# Patient Record
Sex: FEMALE | Race: WHITE
[De-identification: names, ages, dates, MRNs, and addresses within clinical notes are randomized per-mention and may not be internally consistent; named-entity substitution may affect disease eponyms.]

---

## 2020-12-14 ENCOUNTER — HOSPITAL ENCOUNTER (OUTPATIENT)
Age: 25
End: 2020-12-14
Payer: MEDICAID

## 2020-12-14 DIAGNOSIS — Z34.81: Primary | ICD-10-CM

## 2020-12-14 LAB
DEPRECATED RDW RBC: 45.8 FL (ref 35.1–43.9)
ERYTHROCYTE [DISTWIDTH] IN BLOOD: 14.2 % (ref 11.6–14.6)
HCT VFR BLD AUTO: 40.5 % (ref 37–47)
HEMOGLOBIN: 13.1 G/DL (ref 12–15)
HGB BLD-MCNC: 13.1 G/DL (ref 12–15)
IMMATURE GRANULOCYTES COUNT: 0.03 X10^3/UL (ref 0–0)
MCV RBC: 89.4 FL (ref 81–99)
MEAN CORP HGB CONC: 32.3 G/DL (ref 32–36)
MEAN PLATELET VOL.: 9.7 FL (ref 6.2–12)
NRBC FLAGGED BY ANALYZER: 0 % (ref 0–5)
PLATELET # BLD: 240 K/MM3 (ref 150–450)
PLATELET COUNT: 240 K/MM3 (ref 150–450)
RBC # BLD AUTO: 4.53 M/MM3 (ref 4.2–5.4)
RBC DISTRIBUTION WIDTH CV: 14.2 % (ref 11.6–14.6)
RBC DISTRIBUTION WIDTH SD: 45.8 FL (ref 35.1–43.9)
WBC # BLD AUTO: 9.3 K/MM3 (ref 4.4–11)
WHITE BLOOD COUNT: 9.3 K/MM3 (ref 4.4–11)

## 2020-12-14 PROCEDURE — 85025 COMPLETE CBC W/AUTO DIFF WBC: CPT

## 2020-12-14 PROCEDURE — 86803 HEPATITIS C AB TEST: CPT

## 2020-12-14 PROCEDURE — 86703 HIV-1/HIV-2 1 RESULT ANTBDY: CPT

## 2020-12-14 PROCEDURE — 36415 COLL VENOUS BLD VENIPUNCTURE: CPT

## 2020-12-14 PROCEDURE — 87591 N.GONORRHOEAE DNA AMP PROB: CPT

## 2020-12-14 PROCEDURE — 87186 SC STD MICRODIL/AGAR DIL: CPT

## 2020-12-14 PROCEDURE — 86762 RUBELLA ANTIBODY: CPT

## 2020-12-14 PROCEDURE — 88175 CYTOPATH C/V AUTO FLUID REDO: CPT

## 2020-12-14 PROCEDURE — 87077 CULTURE AEROBIC IDENTIFY: CPT

## 2020-12-14 PROCEDURE — 87086 URINE CULTURE/COLONY COUNT: CPT

## 2020-12-14 PROCEDURE — G0145 SCR C/V CYTO,THINLAYER,RESCR: HCPCS

## 2020-12-14 PROCEDURE — 87340 HEPATITIS B SURFACE AG IA: CPT

## 2020-12-14 PROCEDURE — 87088 URINE BACTERIA CULTURE: CPT

## 2020-12-14 PROCEDURE — 87491 CHLMYD TRACH DNA AMP PROBE: CPT

## 2020-12-17 LAB — PRENATAL RPR: NONREACTIVE

## 2021-04-05 ENCOUNTER — HOSPITAL ENCOUNTER (OUTPATIENT)
Age: 26
End: 2021-04-05
Payer: MEDICAID

## 2021-04-05 DIAGNOSIS — Z34.82: Primary | ICD-10-CM

## 2021-04-05 LAB
DEPRECATED RDW RBC: 48.1 FL (ref 35.1–43.9)
ERYTHROCYTE [DISTWIDTH] IN BLOOD: 14.6 % (ref 11.6–14.6)
GLUCOSE 1H P 50 G GLC PO SERPL-MCNC: 104 MG/DL (ref 70–140)
HCT VFR BLD AUTO: 35.4 % (ref 37–47)
HEMOGLOBIN: 11.4 G/DL (ref 12–15)
HGB BLD-MCNC: 11.4 G/DL (ref 12–15)
MCV RBC: 91.2 FL (ref 81–99)
MEAN CORP HGB CONC: 32.2 G/DL (ref 32–36)
MEAN PLATELET VOL.: 10.1 FL (ref 6.2–12)
PLATELET # BLD: 233 K/MM3 (ref 150–450)
PLATELET COUNT: 233 K/MM3 (ref 150–450)
RBC # BLD AUTO: 3.88 M/MM3 (ref 4.2–5.4)
RBC DISTRIBUTION WIDTH CV: 14.6 % (ref 11.6–14.6)
RBC DISTRIBUTION WIDTH SD: 48.1 FL (ref 35.1–43.9)
WBC # BLD AUTO: 5.6 K/MM3 (ref 4.4–11)
WHITE BLOOD COUNT: 5.6 K/MM3 (ref 4.4–11)

## 2021-04-05 PROCEDURE — 36415 COLL VENOUS BLD VENIPUNCTURE: CPT

## 2021-04-05 PROCEDURE — 82950 GLUCOSE TEST: CPT

## 2021-04-05 PROCEDURE — 85027 COMPLETE CBC AUTOMATED: CPT

## 2021-05-25 ENCOUNTER — HOSPITAL ENCOUNTER (OUTPATIENT)
Age: 26
End: 2021-05-25
Payer: MEDICAID

## 2021-05-25 DIAGNOSIS — L29.9: Primary | ICD-10-CM

## 2021-05-25 LAB
ALANINE AMINOTRANSFER ALT/SGPT: 13 U/L (ref 13–56)
ALBUMIN SERPL-MCNC: 2.5 G/DL (ref 3.2–5)
ALKALINE PHOSPHATASE: 98 U/L (ref 45–117)
ANION GAP: 8 (ref 5–15)
AST(SGOT): 16 U/L (ref 15–37)
BUN SERPL-MCNC: 7 MG/DL (ref 7–18)
BUN/CREAT RATIO: 14.1 RATIO (ref 10–20)
CALCIUM SERPL-MCNC: 8.5 MG/DL (ref 8.5–10.1)
CARBON DIOXIDE: 24 MMOL/L (ref 21–32)
CHLORIDE: 108 MMOL/L (ref 98–107)
CREAT UR-MCNC: 91.3 MG/DL
DEPRECATED RDW RBC: 44.8 FL (ref 35.1–43.9)
ERYTHROCYTE [DISTWIDTH] IN BLOOD: 14.4 % (ref 11.6–14.6)
EST GLOM FILT RATE - AFR AMER: 197 ML/MIN (ref 60–?)
GLOBULIN: 3.8 G/DL (ref 2.2–4.2)
GLUCOSE: 98 MG/DL (ref 74–106)
HCT VFR BLD AUTO: 33.8 % (ref 37–47)
HEMOGLOBIN: 10.5 G/DL (ref 12–15)
HGB BLD-MCNC: 10.5 G/DL (ref 12–15)
LDH: 181 U/L (ref 84–246)
MCV RBC: 85.8 FL (ref 81–99)
MEAN CORP HGB CONC: 31.1 G/DL (ref 32–36)
MEAN PLATELET VOL.: 11.1 FL (ref 6.2–12)
MUCOUS THREADS URNS QL MICRO: (no result) /HPF
PLATELET # BLD: 164 K/MM3 (ref 150–450)
PLATELET COUNT: 164 K/MM3 (ref 150–450)
POTASSIUM: 3.5 MMOL/L (ref 3.5–5.1)
PROT UR-MCNC: 12.2 MG/DL (ref ?–11.9)
PROT/CREAT UR: 134 MG/G CRE (ref 0–200)
RBC # BLD AUTO: 3.94 M/MM3 (ref 4.2–5.4)
RBC DISTRIBUTION WIDTH CV: 14.4 % (ref 11.6–14.6)
RBC DISTRIBUTION WIDTH SD: 44.8 FL (ref 35.1–43.9)
RBC UR QL: (no result) /HPF (ref 0–5)
SP GR UR: 1.02 (ref 1–1.03)
SQUAMOUS URNS QL MICRO: (no result) /HPF (ref 5–10)
URINE PRESERVATIVE: (no result)
WBC # BLD AUTO: 6.7 K/MM3 (ref 4.4–11)
WHITE BLOOD COUNT: 6.7 K/MM3 (ref 4.4–11)

## 2021-05-25 PROCEDURE — 80053 COMPREHEN METABOLIC PANEL: CPT

## 2021-05-25 PROCEDURE — 81001 URINALYSIS AUTO W/SCOPE: CPT

## 2021-05-25 PROCEDURE — 82570 ASSAY OF URINE CREATININE: CPT

## 2021-05-25 PROCEDURE — 36415 COLL VENOUS BLD VENIPUNCTURE: CPT

## 2021-05-25 PROCEDURE — 85027 COMPLETE CBC AUTOMATED: CPT

## 2021-05-25 PROCEDURE — 84156 ASSAY OF PROTEIN URINE: CPT

## 2021-05-25 PROCEDURE — 83615 LACTATE (LD) (LDH) ENZYME: CPT

## 2021-08-10 ENCOUNTER — HOSPITAL ENCOUNTER (OUTPATIENT)
Age: 26
End: 2021-08-10
Payer: MEDICAID

## 2021-08-10 DIAGNOSIS — R68.82: Primary | ICD-10-CM

## 2021-08-10 PROCEDURE — 84439 ASSAY OF FREE THYROXINE: CPT

## 2021-08-10 PROCEDURE — 84443 ASSAY THYROID STIM HORMONE: CPT

## 2024-05-21 ENCOUNTER — APPOINTMENT (OUTPATIENT)
Dept: OBSTETRICS AND GYNECOLOGY | Facility: CLINIC | Age: 29
End: 2024-05-21

## 2024-10-14 ENCOUNTER — HOSPITAL ENCOUNTER (EMERGENCY)
Facility: HOSPITAL | Age: 29
Discharge: OTHER INSTITUTION | End: 2024-10-15
Attending: EMERGENCY MEDICINE
Payer: COMMERCIAL

## 2024-10-14 ENCOUNTER — APPOINTMENT (OUTPATIENT)
Dept: CARDIOLOGY | Facility: HOSPITAL | Age: 29
End: 2024-10-14
Payer: COMMERCIAL

## 2024-10-14 ENCOUNTER — APPOINTMENT (OUTPATIENT)
Dept: RADIOLOGY | Facility: HOSPITAL | Age: 29
End: 2024-10-14
Payer: COMMERCIAL

## 2024-10-14 DIAGNOSIS — J18.9 PNEUMONIA DUE TO INFECTIOUS ORGANISM, UNSPECIFIED LATERALITY, UNSPECIFIED PART OF LUNG: Primary | ICD-10-CM

## 2024-10-14 LAB
ALBUMIN SERPL BCP-MCNC: 3.8 G/DL (ref 3.4–5)
ALP SERPL-CCNC: 61 U/L (ref 33–110)
ALT SERPL W P-5'-P-CCNC: 26 U/L (ref 7–45)
ANION GAP SERPL CALC-SCNC: 13 MMOL/L (ref 10–20)
AST SERPL W P-5'-P-CCNC: 15 U/L (ref 9–39)
BASOPHILS # BLD AUTO: 0.05 X10*3/UL (ref 0–0.1)
BASOPHILS NFR BLD AUTO: 0.5 %
BILIRUB SERPL-MCNC: 0.4 MG/DL (ref 0–1.2)
BUN SERPL-MCNC: 10 MG/DL (ref 6–23)
CALCIUM SERPL-MCNC: 8.7 MG/DL (ref 8.6–10.3)
CHLORIDE SERPL-SCNC: 104 MMOL/L (ref 98–107)
CO2 SERPL-SCNC: 25 MMOL/L (ref 21–32)
CREAT SERPL-MCNC: 0.56 MG/DL (ref 0.5–1.05)
EGFRCR SERPLBLD CKD-EPI 2021: >90 ML/MIN/1.73M*2
EOSINOPHIL # BLD AUTO: 0.1 X10*3/UL (ref 0–0.7)
EOSINOPHIL NFR BLD AUTO: 1 %
ERYTHROCYTE [DISTWIDTH] IN BLOOD BY AUTOMATED COUNT: 14.3 % (ref 11.5–14.5)
GLUCOSE SERPL-MCNC: 106 MG/DL (ref 74–99)
HCT VFR BLD AUTO: 40.4 % (ref 36–46)
HGB BLD-MCNC: 13.3 G/DL (ref 12–16)
IMM GRANULOCYTES # BLD AUTO: 0.05 X10*3/UL (ref 0–0.7)
IMM GRANULOCYTES NFR BLD AUTO: 0.5 % (ref 0–0.9)
LACTATE SERPL-SCNC: 2.3 MMOL/L (ref 0.4–2)
LYMPHOCYTES # BLD AUTO: 1.46 X10*3/UL (ref 1.2–4.8)
LYMPHOCYTES NFR BLD AUTO: 14 %
MCH RBC QN AUTO: 29.2 PG (ref 26–34)
MCHC RBC AUTO-ENTMCNC: 32.9 G/DL (ref 32–36)
MCV RBC AUTO: 89 FL (ref 80–100)
MONOCYTES # BLD AUTO: 0.37 X10*3/UL (ref 0.1–1)
MONOCYTES NFR BLD AUTO: 3.6 %
NEUTROPHILS # BLD AUTO: 8.39 X10*3/UL (ref 1.2–7.7)
NEUTROPHILS NFR BLD AUTO: 80.4 %
NRBC BLD-RTO: 0 /100 WBCS (ref 0–0)
PLATELET # BLD AUTO: 362 X10*3/UL (ref 150–450)
POTASSIUM SERPL-SCNC: 3 MMOL/L (ref 3.5–5.3)
PROT SERPL-MCNC: 7 G/DL (ref 6.4–8.2)
RBC # BLD AUTO: 4.55 X10*6/UL (ref 4–5.2)
SODIUM SERPL-SCNC: 139 MMOL/L (ref 136–145)
WBC # BLD AUTO: 10.4 X10*3/UL (ref 4.4–11.3)

## 2024-10-14 PROCEDURE — 80053 COMPREHEN METABOLIC PANEL: CPT | Performed by: EMERGENCY MEDICINE

## 2024-10-14 PROCEDURE — 36415 COLL VENOUS BLD VENIPUNCTURE: CPT | Performed by: EMERGENCY MEDICINE

## 2024-10-14 PROCEDURE — 96367 TX/PROPH/DG ADDL SEQ IV INF: CPT

## 2024-10-14 PROCEDURE — 96365 THER/PROPH/DIAG IV INF INIT: CPT

## 2024-10-14 PROCEDURE — 71046 X-RAY EXAM CHEST 2 VIEWS: CPT | Performed by: RADIOLOGY

## 2024-10-14 PROCEDURE — 71046 X-RAY EXAM CHEST 2 VIEWS: CPT

## 2024-10-14 PROCEDURE — 93005 ELECTROCARDIOGRAM TRACING: CPT

## 2024-10-14 PROCEDURE — 99285 EMERGENCY DEPT VISIT HI MDM: CPT | Mod: 25

## 2024-10-14 PROCEDURE — 2500000002 HC RX 250 W HCPCS SELF ADMINISTERED DRUGS (ALT 637 FOR MEDICARE OP, ALT 636 FOR OP/ED): Performed by: EMERGENCY MEDICINE

## 2024-10-14 PROCEDURE — 83605 ASSAY OF LACTIC ACID: CPT | Performed by: EMERGENCY MEDICINE

## 2024-10-14 PROCEDURE — 87040 BLOOD CULTURE FOR BACTERIA: CPT | Mod: 59,SAMLAB | Performed by: EMERGENCY MEDICINE

## 2024-10-14 PROCEDURE — 2500000004 HC RX 250 GENERAL PHARMACY W/ HCPCS (ALT 636 FOR OP/ED): Performed by: EMERGENCY MEDICINE

## 2024-10-14 PROCEDURE — 85025 COMPLETE CBC W/AUTO DIFF WBC: CPT | Performed by: EMERGENCY MEDICINE

## 2024-10-14 RX ORDER — POTASSIUM CHLORIDE 20 MEQ/1
20 TABLET, EXTENDED RELEASE ORAL ONCE
Status: COMPLETED | OUTPATIENT
Start: 2024-10-14 | End: 2024-10-14

## 2024-10-14 RX ORDER — CEFTRIAXONE 1 G/50ML
1 INJECTION, SOLUTION INTRAVENOUS ONCE
Status: COMPLETED | OUTPATIENT
Start: 2024-10-14 | End: 2024-10-14

## 2024-10-14 ASSESSMENT — PAIN SCALES - GENERAL: PAINLEVEL_OUTOF10: 3

## 2024-10-14 ASSESSMENT — COLUMBIA-SUICIDE SEVERITY RATING SCALE - C-SSRS
6. HAVE YOU EVER DONE ANYTHING, STARTED TO DO ANYTHING, OR PREPARED TO DO ANYTHING TO END YOUR LIFE?: NO
1. IN THE PAST MONTH, HAVE YOU WISHED YOU WERE DEAD OR WISHED YOU COULD GO TO SLEEP AND NOT WAKE UP?: NO
2. HAVE YOU ACTUALLY HAD ANY THOUGHTS OF KILLING YOURSELF?: NO

## 2024-10-14 ASSESSMENT — PAIN - FUNCTIONAL ASSESSMENT: PAIN_FUNCTIONAL_ASSESSMENT: 0-10

## 2024-10-15 ENCOUNTER — APPOINTMENT (OUTPATIENT)
Dept: RADIOLOGY | Facility: HOSPITAL | Age: 29
End: 2024-10-15
Payer: COMMERCIAL

## 2024-10-15 VITALS
WEIGHT: 140 LBS | SYSTOLIC BLOOD PRESSURE: 111 MMHG | OXYGEN SATURATION: 95 % | TEMPERATURE: 97.9 F | HEART RATE: 95 BPM | DIASTOLIC BLOOD PRESSURE: 69 MMHG | HEIGHT: 60 IN | RESPIRATION RATE: 16 BRPM | BODY MASS INDEX: 27.48 KG/M2

## 2024-10-15 LAB
ATRIAL RATE: 120 BPM
LACTATE SERPL-SCNC: 2.1 MMOL/L (ref 0.4–2)
P AXIS: 78 DEGREES
P OFFSET: 205 MS
P ONSET: 160 MS
PR INTERVAL: 124 MS
Q ONSET: 222 MS
QRS COUNT: 19 BEATS
QRS DURATION: 70 MS
QT INTERVAL: 346 MS
QTC CALCULATION(BAZETT): 489 MS
QTC FREDERICIA: 435 MS
R AXIS: 65 DEGREES
T AXIS: 42 DEGREES
T OFFSET: 395 MS
VENTRICULAR RATE: 120 BPM

## 2024-10-15 PROCEDURE — 83605 ASSAY OF LACTIC ACID: CPT | Performed by: EMERGENCY MEDICINE

## 2024-10-15 PROCEDURE — 36415 COLL VENOUS BLD VENIPUNCTURE: CPT | Performed by: EMERGENCY MEDICINE

## 2024-10-15 PROCEDURE — 96361 HYDRATE IV INFUSION ADD-ON: CPT

## 2024-10-15 PROCEDURE — 2550000001 HC RX 255 CONTRASTS: Performed by: EMERGENCY MEDICINE

## 2024-10-15 PROCEDURE — 71275 CT ANGIOGRAPHY CHEST: CPT | Performed by: RADIOLOGY

## 2024-10-15 PROCEDURE — 71275 CT ANGIOGRAPHY CHEST: CPT

## 2024-10-15 RX ORDER — IBUPROFEN 200 MG
400 TABLET ORAL EVERY 6 HOURS
COMMUNITY

## 2024-10-15 NOTE — ED PROVIDER NOTES
HPI   Chief Complaint   Patient presents with    Shortness of Breath     Pt states she just finished atbx for a pneumonia diagnosis, was having increasing shortness of breath today.        29-year-old female presents with cough and dyspnea.  Patient was diagnosed with pneumonia approximately 7 days ago and just finished a course of antibiotics.  Patient states she has not really improved that much.  When I examined her she is not dyspneic but she is tachycardic.  Denies any nausea or vomiting with symptoms.  The patient just finished doxycycline.  Patient states she had some transient improvement which only lasted several days.  Patient was given 1 g of Rocephin and 500 mg of IV Zithromax.  She also received a liter of fluid.  Patient still had periods of hypoxia with pO2 levels in the low 90s.  She did remain tachycardic even after fluid hydration.  Patient will require admission and be transferred to [place].      History provided by:  Patient          Patient History   Past Medical History:   Diagnosis Date    Encounter for gynecological examination (general) (routine) without abnormal findings     Pap test, as part of routine gynecological examination    Other conditions influencing health status     Menstruation     Past Surgical History:   Procedure Laterality Date    OTHER SURGICAL HISTORY  2019     section     No family history on file.  Social History     Tobacco Use    Smoking status: Not on file    Smokeless tobacco: Not on file   Substance Use Topics    Alcohol use: Not on file    Drug use: Not on file       Physical Exam   ED Triage Vitals [10/14/24 2150]   Temperature Heart Rate Respirations BP   36.6 °C (97.9 °F) (!) 130 (!) 22 112/59      Pulse Ox Temp src Heart Rate Source Patient Position   94 % -- -- --      BP Location FiO2 (%)     -- --       Physical Exam  Vitals and nursing note reviewed.   Constitutional:       General: She is not in acute distress.     Appearance: She is  well-developed.   HENT:      Head: Normocephalic and atraumatic.   Eyes:      Conjunctiva/sclera: Conjunctivae normal.   Cardiovascular:      Rate and Rhythm: Regular rhythm. Tachycardia present.      Heart sounds: No murmur heard.  Pulmonary:      Effort: Pulmonary effort is normal. No respiratory distress.      Breath sounds: Examination of the right-lower field reveals decreased breath sounds and rhonchi. Examination of the left-lower field reveals decreased breath sounds and rhonchi. Decreased breath sounds and rhonchi present.   Abdominal:      Palpations: Abdomen is soft.      Tenderness: There is no abdominal tenderness.   Musculoskeletal:         General: No swelling.      Cervical back: Neck supple.   Skin:     General: Skin is warm and dry.      Capillary Refill: Capillary refill takes less than 2 seconds.   Neurological:      Mental Status: She is alert.   Psychiatric:         Mood and Affect: Mood normal.            CT angio chest for pulmonary embolism   Final Result   No acute pulmonary embolus to the segmental level.        Patchy right basilar airspace opacity most compatible with pneumonia.             MACRO:   None.        Signed by: Evan Finkelstein 10/15/2024 1:56 AM   Dictation workstation:   [id number]      XR chest 2 views   Final Result   Mild increased perihilar opacities suggestive of developing   interstitial edema. Superimposed infection not excluded. Clinical   correlation and continued short-term follow-up is advised.        MACRO:   None        Signed by: Nichole Blanton 10/14/2024 10:31 PM   Dictation workstation:   [id number]         Labs Reviewed   CBC WITH AUTO DIFFERENTIAL - Abnormal       Result Value    WBC 10.4      nRBC 0.0      RBC 4.55      Hemoglobin 13.3      Hematocrit 40.4      MCV 89      MCH 29.2      MCHC 32.9      RDW 14.3      Platelets 362      Neutrophils % 80.4      Immature Granulocytes %, Automated 0.5      Lymphocytes % 14.0      Monocytes % 3.6       Eosinophils % 1.0      Basophils % 0.5      Neutrophils Absolute 8.39 (*)     Immature Granulocytes Absolute, Automated 0.05      Lymphocytes Absolute 1.46      Monocytes Absolute 0.37      Eosinophils Absolute 0.10      Basophils Absolute 0.05     COMPREHENSIVE METABOLIC PANEL - Abnormal    Glucose 106 (*)     Sodium 139      Potassium 3.0 (*)     Chloride 104      Bicarbonate 25      Anion Gap 13      Urea Nitrogen 10      Creatinine 0.56      eGFR >90      Calcium 8.7      Albumin 3.8      Alkaline Phosphatase 61      Total Protein 7.0      AST 15      Bilirubin, Total 0.4      ALT 26     LACTATE - Abnormal    Lactate 2.3 (*)     Narrative:     Venipuncture immediately after or during the administration of Metamizole may lead to falsely low results. Testing should be performed immediately prior to Metamizole dosing.   LACTATE - Abnormal    Lactate 2.1 (*)     Narrative:     Venipuncture immediately after or during the administration of Metamizole may lead to falsely low results. Testing should be performed immediately prior to Metamizole dosing.   BLOOD CULTURE   BLOOD CULTURE      ED Course & MDM   ED Course as of 10/15/24 0226   Mon Oct 14, 2024   2224 Twelve-lead EKG interpreted by myself at 2150 1 sinus tachycardia at 120 2 normal axis 3 right atrial enlargement [MS]      ED Course User Index  [MS] Etienne Fragoso DO         Diagnoses as of 10/15/24 0226   Pneumonia due to infectious organism, unspecified laterality, unspecified part of lung                 No data recorded     [place] Coma Scale Score: 15 (10/14/24 2151 : Chana Martinez RN)                           Medical Decision Making  Transfer to [facility]    Procedure  Procedures     Etienne Fragoso DO  10/15/24 0226

## 2024-10-19 LAB
BACTERIA BLD CULT: NORMAL
BACTERIA BLD CULT: NORMAL

## 2025-04-09 ENCOUNTER — APPOINTMENT (OUTPATIENT)
Dept: PRIMARY CARE | Facility: CLINIC | Age: 30
End: 2025-04-09
Payer: COMMERCIAL

## 2025-04-09 VITALS
OXYGEN SATURATION: 98 % | DIASTOLIC BLOOD PRESSURE: 82 MMHG | HEIGHT: 60 IN | HEART RATE: 90 BPM | SYSTOLIC BLOOD PRESSURE: 117 MMHG | WEIGHT: 154.6 LBS | BODY MASS INDEX: 30.35 KG/M2

## 2025-04-09 DIAGNOSIS — Z76.89 ENCOUNTER TO ESTABLISH CARE WITH NEW PROVIDER: Primary | ICD-10-CM

## 2025-04-09 DIAGNOSIS — Z71.3 DIETARY COUNSELING AND SURVEILLANCE: ICD-10-CM

## 2025-04-09 DIAGNOSIS — Z00.00 ENCOUNTER FOR WELLNESS EXAMINATION IN ADULT: ICD-10-CM

## 2025-04-09 DIAGNOSIS — G43.831 INTRACTABLE MENSTRUAL MIGRAINE WITH STATUS MIGRAINOSUS: ICD-10-CM

## 2025-04-09 DIAGNOSIS — E66.811 CLASS 1 OBESITY DUE TO EXCESS CALORIES WITHOUT SERIOUS COMORBIDITY WITH BODY MASS INDEX (BMI) OF 30.0 TO 30.9 IN ADULT: ICD-10-CM

## 2025-04-09 DIAGNOSIS — E66.09 CLASS 1 OBESITY DUE TO EXCESS CALORIES WITHOUT SERIOUS COMORBIDITY WITH BODY MASS INDEX (BMI) OF 30.0 TO 30.9 IN ADULT: ICD-10-CM

## 2025-04-09 DIAGNOSIS — R73.02 GLUCOSE INTOLERANCE (IMPAIRED GLUCOSE TOLERANCE): ICD-10-CM

## 2025-04-09 PROCEDURE — 99204 OFFICE O/P NEW MOD 45 MIN: CPT | Performed by: PHYSICIAN ASSISTANT

## 2025-04-09 PROCEDURE — 1036F TOBACCO NON-USER: CPT | Performed by: PHYSICIAN ASSISTANT

## 2025-04-09 PROCEDURE — 3008F BODY MASS INDEX DOCD: CPT | Performed by: PHYSICIAN ASSISTANT

## 2025-04-09 RX ORDER — NORETHINDRONE 0.35 MG/1
1 TABLET ORAL DAILY
COMMUNITY
Start: 2019-11-01 | End: 2025-04-09 | Stop reason: WASHOUT

## 2025-04-09 RX ORDER — CELECOXIB 200 MG/1
CAPSULE ORAL
COMMUNITY
Start: 2019-10-05 | End: 2025-04-09 | Stop reason: WASHOUT

## 2025-04-09 RX ORDER — DOXYCYCLINE HYCLATE 100 MG
1 TABLET ORAL
COMMUNITY
Start: 2024-10-07 | End: 2025-04-09 | Stop reason: ALTCHOICE

## 2025-04-09 RX ORDER — OXYMETAZOLINE HCL 0.05 %
SPRAY, NON-AEROSOL (ML) NASAL 2 TIMES DAILY
COMMUNITY
End: 2025-04-09 | Stop reason: WASHOUT

## 2025-04-09 ASSESSMENT — ENCOUNTER SYMPTOMS
COUGH: 0
RHINORRHEA: 0
NAUSEA: 0
NECK PAIN: 0
SINUS PAIN: 0
CONFUSION: 0
FLANK PAIN: 0
TREMORS: 0
WHEEZING: 0
CHILLS: 0
ABDOMINAL PAIN: 0
VOMITING: 0
DIARRHEA: 1
SHORTNESS OF BREATH: 0
CONSTIPATION: 0
BRUISES/BLEEDS EASILY: 0
EYE DISCHARGE: 0
WOUND: 0
ABDOMINAL DISTENTION: 1
FEVER: 0
SLEEP DISTURBANCE: 0
CHEST TIGHTNESS: 0
HEADACHES: 1
FREQUENCY: 0
DIZZINESS: 0
SORE THROAT: 0
NUMBNESS: 0
BACK PAIN: 0
FATIGUE: 1
PALPITATIONS: 0
EYE REDNESS: 0

## 2025-04-09 ASSESSMENT — PATIENT HEALTH QUESTIONNAIRE - PHQ9
2. FEELING DOWN, DEPRESSED OR HOPELESS: NOT AT ALL
1. LITTLE INTEREST OR PLEASURE IN DOING THINGS: NOT AT ALL
SUM OF ALL RESPONSES TO PHQ9 QUESTIONS 1 AND 2: 0

## 2025-04-09 NOTE — PROGRESS NOTES
Subjective   Patient ID: Cesia Brunner is a 29 y.o. female who presents for New Patient Visit    HPI  Est as new patient     Labs     Med check   Hormonal migraines - well controlled     IBS???- denies diagnosis but questions with some symptoms this or food intolerance - diarrhea more than constipation /bloating at times.  Denies pain so less likely gallbladder etiology.  Discussed food elimination.  Treating naturally and working on stress management - consider additional testing - pt to call if she wants to pursue     Off episodes in the past   She does have hx of HA/migraines since she was young but typically have been well managed and only notes more menstrual migraines at this time   2 episodes maybe in the last 5 years  where she would trouble focusing- reading a word or comprehending something and this would last maybe 30 min but then full resolution.  She denies any known cardio or neuro fam hx .  We discussed work up for echo vs was this more of a hemiplegic migraine with her history. She denies any episode in over a year - I have strongly encouraged her to reach out when she has an episode and come here or go to the ER for more timely to work up to determine etiology of the spell      Obesity - pt would like to lose weight and is eating well but states cannot seem to lose and questions if this is contributing to other symptoms   We discussed diet and ex, dietician, labs, injectables, adipex.  Pt to check with insurance and get her labs done and if approp can call to start wegovy type med if she wishes but is aware she would need to be seen monthly for wt checks in the beginning     Preventative testing   PAP - [facility]   Mammo  DEXA   Colon     Fall NEG April 2025   PHQ2 - NEG April 2025     There is no problem list on file for this patient.      Review of Systems   Constitutional:  Positive for fatigue. Negative for chills and fever.   HENT:  Negative for congestion, rhinorrhea, sinus pain,  sore throat and tinnitus.    Eyes:  Negative for discharge, redness and visual disturbance.   Respiratory:  Negative for cough, chest tightness, shortness of breath and wheezing.    Cardiovascular:  Negative for chest pain, palpitations and leg swelling.   Gastrointestinal:  Positive for abdominal distention and diarrhea. Negative for abdominal pain, constipation, nausea and vomiting.   Endocrine: Negative for cold intolerance and heat intolerance.   Genitourinary:  Negative for flank pain, frequency and urgency.   Musculoskeletal:  Negative for back pain, gait problem and neck pain.   Skin:  Negative for rash and wound.   Neurological:  Positive for headaches. Negative for dizziness, tremors, syncope and numbness.   Hematological:  Does not bruise/bleed easily.   Psychiatric/Behavioral:  Negative for confusion, sleep disturbance and suicidal ideas.        Past Medical History:   Diagnosis Date    Encounter for gynecological examination (general) (routine) without abnormal findings     Pap test, as part of routine gynecological examination    Other conditions influencing health status     Menstruation       Past Surgical History:   Procedure Laterality Date    OTHER SURGICAL HISTORY  2019     section       No family history on file.    Social History     Tobacco Use    Smoking status: Never     Passive exposure: Never    Smokeless tobacco: Never   Substance Use Topics    Alcohol use: Yes     Comment: rare    Drug use: Never       No Known Allergies    Current Outpatient Medications   Medication Sig Dispense Refill    albuterol 90 mcg/actuation aerosol powdr breath activated inhaler Inhale 2 puffs every 4 hours if needed for wheezing.      celecoxib (CeleBREX) 200 mg capsule Take by mouth. (Patient not taking: Reported on 2025)      doxycycline (Vibra-Tabs) 100 mg tablet Take 1 tablet (100 mg) by mouth every 12 hours. (Patient not taking: Reported on 2025)      ibuprofen 200 mg tablet Take 2  tablets (400 mg) by mouth every 6 hours.      norethindrone (Micronor) 0.35 mg tablet Take 1 tablet (0.35 mg) by mouth once daily. (Patient not taking: Reported on 4/9/2025)      oxymetazoline 0.05 % nasal spray Administer into affected nostril(s) twice a day. (Patient not taking: Reported on 4/9/2025)       No current facility-administered medications for this visit.       Objective   /82   Pulse 90   Ht 1.524 m (5')   Wt 70.1 kg (154 lb 9.6 oz)   SpO2 98%   BMI 30.19 kg/m²     Physical Exam  Vitals reviewed.   Constitutional:       Appearance: Normal appearance. She is obese.   HENT:      Head: Normocephalic.      Right Ear: External ear normal.      Left Ear: External ear normal.      Nose: Nose normal. No congestion or rhinorrhea.      Mouth/Throat:      Mouth: Mucous membranes are moist.   Eyes:      Extraocular Movements: Extraocular movements intact.      Conjunctiva/sclera: Conjunctivae normal.      Pupils: Pupils are equal, round, and reactive to light.   Cardiovascular:      Rate and Rhythm: Normal rate and regular rhythm.      Pulses: Normal pulses.   Pulmonary:      Effort: Pulmonary effort is normal.      Breath sounds: Normal breath sounds.   Abdominal:      General: Bowel sounds are normal.      Palpations: Abdomen is soft.      Tenderness: There is no abdominal tenderness. There is no right CVA tenderness or left CVA tenderness.   Musculoskeletal:         General: No tenderness. Normal range of motion.      Cervical back: Normal range of motion and neck supple. No tenderness.   Skin:     General: Skin is warm and dry.   Neurological:      General: No focal deficit present.      Mental Status: She is alert and oriented to person, place, and time.   Psychiatric:         Mood and Affect: Mood normal.         Behavior: Behavior normal.       Testing   Reviewed old labs on file       Impression    MDM    1) COMPLEXITY: 1 UNDIAGNOSED NEW PROBLEM WITH UNCERTAIN PROGNOSIS  2)DATA: TESTS  INTERPRETED AND OR ORDERED, TOOK INDEPENDENT HISTORY OR RECORDS REVIEWED  3)RISK: MODERATE RISK DUE TO NATURE OF MEDICAL CONDITIONS/COMORBIDITY OR MEDICATIONS ORDERED OR SURGICAL OR PROCEDURE REFERRAL, .       Reviewed labs and Testing on file   Patient to follow diet low in cholesterol, fat, and sodium.    Patient is advised to increase Exercise.  Patient is recommended to lose weight.  Reviewed Meds and discussed common side effects  Continue as directed   Patient is strongly advised to be compliant with recommendations.    Return to Clinic sooner if needed.  Patient denies further questions/concerns at this time     Assessment/Plan   Problem List Items Addressed This Visit             ICD-10-CM    Glucose intolerance (impaired glucose tolerance) R73.02    Relevant Orders    CBC and Auto Differential    Comprehensive Metabolic Panel    Hemoglobin A1C    Lipid Panel    Thyroid Stimulating Hormone    Thyroxine, Free    Magnesium    Class 1 obesity due to excess calories without serious comorbidity with body mass index (BMI) of 30.0 to 30.9 in adult E66.811, E66.09, Z68.30    Relevant Orders    CBC and Auto Differential    Comprehensive Metabolic Panel    Hemoglobin A1C    Lipid Panel    Thyroid Stimulating Hormone    Thyroxine, Free    Magnesium    Dietary counseling and surveillance Z71.3    Intractable menstrual migraine with status migrainosus G43.831     Other Visit Diagnoses         Codes    Encounter to establish care with new provider    -  Primary Z76.89    Encounter for wellness examination in adult     Z00.00    Relevant Orders    CBC and Auto Differential    Comprehensive Metabolic Panel    Hemoglobin A1C    Lipid Panel    Thyroid Stimulating Hormone    Thyroxine, Free    Magnesium             FU 3-12 mo with wellness / LABS at [facility] -= pt to call

## 2025-05-29 ENCOUNTER — OFFICE VISIT (OUTPATIENT)
Dept: PRIMARY CARE | Facility: CLINIC | Age: 30
End: 2025-05-29
Payer: COMMERCIAL

## 2025-05-29 VITALS
WEIGHT: 156 LBS | BODY MASS INDEX: 30.63 KG/M2 | HEIGHT: 60 IN | SYSTOLIC BLOOD PRESSURE: 111 MMHG | HEART RATE: 79 BPM | DIASTOLIC BLOOD PRESSURE: 72 MMHG

## 2025-05-29 DIAGNOSIS — E66.811 CLASS 1 OBESITY DUE TO EXCESS CALORIES WITHOUT SERIOUS COMORBIDITY WITH BODY MASS INDEX (BMI) OF 30.0 TO 30.9 IN ADULT: ICD-10-CM

## 2025-05-29 DIAGNOSIS — N63.21 MASS OF UPPER OUTER QUADRANT OF LEFT BREAST: Primary | ICD-10-CM

## 2025-05-29 DIAGNOSIS — E66.09 CLASS 1 OBESITY DUE TO EXCESS CALORIES WITHOUT SERIOUS COMORBIDITY WITH BODY MASS INDEX (BMI) OF 30.0 TO 30.9 IN ADULT: ICD-10-CM

## 2025-05-29 DIAGNOSIS — N60.12 FIBROCYSTIC CHANGE OF BREAST, LEFT: ICD-10-CM

## 2025-05-29 PROCEDURE — 99214 OFFICE O/P EST MOD 30 MIN: CPT | Performed by: PHYSICIAN ASSISTANT

## 2025-05-29 PROCEDURE — 1036F TOBACCO NON-USER: CPT | Performed by: PHYSICIAN ASSISTANT

## 2025-05-29 PROCEDURE — 3008F BODY MASS INDEX DOCD: CPT | Performed by: PHYSICIAN ASSISTANT

## 2025-05-29 ASSESSMENT — ENCOUNTER SYMPTOMS
FREQUENCY: 0
BACK PAIN: 0
DIARRHEA: 0
DIZZINESS: 0
FLANK PAIN: 0
FEVER: 0
ABDOMINAL PAIN: 0
EYE DISCHARGE: 0
EYE REDNESS: 0
SINUS PAIN: 0
CONFUSION: 0
WHEEZING: 0
PALPITATIONS: 0
BRUISES/BLEEDS EASILY: 0
NAUSEA: 0
CONSTIPATION: 0
COUGH: 0
FATIGUE: 0
SLEEP DISTURBANCE: 0
CHEST TIGHTNESS: 0
SHORTNESS OF BREATH: 0
HEADACHES: 0
NECK PAIN: 0
RHINORRHEA: 0
NUMBNESS: 0
SORE THROAT: 0
ROS SKIN COMMENTS: L BREAST
CHILLS: 0
VOMITING: 0
TREMORS: 0
WOUND: 0

## 2025-05-29 NOTE — PROGRESS NOTES
Subjective   Patient ID: Cesia Brunner is a 30 y.o. female who presents for Follow-up (C/O INDENT IN LT BREAST)    HPI  Breast concerns   L breast indent x 1 year and thinks it is getting worse   She kena hx of mammo   Last visit with GYN was within 6 mo - she didn't mention it then but states she did have the spot but the GYN did not note concern during her PE   Pt youngest was born in 2021 and breast fed sometime into 2023   Pt denies unexplainable wt loss, fever, nipple discharge or fam hx of breast cancer     Med check - currently not taking meds and denies changes in meds in the past year prior to this      Preventative testing   PAP - [facility]   Mammo  DEXA   Colon      Fall NEG April 2025   PHQ2 - NEG April 2025       Problem List[1]    Review of Systems   Constitutional:  Negative for chills, fatigue and fever.   HENT:  Negative for congestion, rhinorrhea, sinus pain, sore throat and tinnitus.    Eyes:  Negative for discharge, redness and visual disturbance.   Respiratory:  Negative for cough, chest tightness, shortness of breath and wheezing.    Cardiovascular:  Negative for chest pain, palpitations and leg swelling.   Gastrointestinal:  Negative for abdominal pain, constipation, diarrhea, nausea and vomiting.   Endocrine: Negative for cold intolerance and heat intolerance.   Genitourinary:  Negative for flank pain, frequency and urgency.   Musculoskeletal:  Negative for back pain, gait problem and neck pain.   Skin:  Negative for rash and wound.        L breast   Neurological:  Negative for dizziness, tremors, syncope, numbness and headaches.   Hematological:  Does not bruise/bleed easily.   Psychiatric/Behavioral:  Negative for confusion, sleep disturbance and suicidal ideas.        Medical History[2]    Surgical History[3]    Family History[4]    Social History[5]    Allergies[6]    Current Medications[7]    Objective   /72   Pulse 79   Ht 1.524 m (5')   Wt 70.8 kg (156 lb)    BMI 30.47 kg/m²     Physical Exam  Vitals reviewed.   Constitutional:       Appearance: Normal appearance. She is obese.   HENT:      Head: Normocephalic.      Right Ear: External ear normal.      Left Ear: External ear normal.      Nose: Nose normal. No congestion or rhinorrhea.      Mouth/Throat:      Mouth: Mucous membranes are moist.   Eyes:      Extraocular Movements: Extraocular movements intact.      Conjunctiva/sclera: Conjunctivae normal.      Pupils: Pupils are equal, round, and reactive to light.   Cardiovascular:      Rate and Rhythm: Normal rate and regular rhythm.      Pulses: Normal pulses.   Pulmonary:      Effort: Pulmonary effort is normal.      Breath sounds: Normal breath sounds.   Abdominal:      General: Bowel sounds are normal.      Palpations: Abdomen is soft.      Tenderness: There is no abdominal tenderness. There is no right CVA tenderness or left CVA tenderness.   Musculoskeletal:         General: No tenderness. Normal range of motion.      Cervical back: Normal range of motion and neck supple. No tenderness.   Skin:     General: Skin is warm and dry.      Comments: L breast concerns   Fullness noted in the entire region of the L breast upper outer quad  Nipple indent noted while sitting but also when laying down   I am hopefully this is just from the hormonal changes and scarring  of breast feeding but suggest additional imaging to make sure    Neurological:      General: No focal deficit present.      Mental Status: She is alert and oriented to person, place, and time.   Psychiatric:         Mood and Affect: Mood normal.         Behavior: Behavior normal.         Testing   Reviewed old labs on file  Reviewed labs set to be done when convenient     Impression    MDM    1) COMPLEXITY: 1 UNDIAGNOSED NEW PROBLEM WITH UNCERTAIN PROGNOSIS  2)DATA: TESTS INTERPRETED AND OR ORDERED, TOOK INDEPENDENT HISTORY OR RECORDS REVIEWED  3)RISK: MODERATE RISK DUE TO NATURE OF MEDICAL  CONDITIONS/COMORBIDITY OR MEDICATIONS ORDERED OR SURGICAL OR PROCEDURE REFERRAL, .     Reviewed labs and Testing on file   Patient to follow diet low in cholesterol, fat, and sodium.    Patient is advised to increase Exercise.  Patient is recommended to lose weight.  Reviewed Meds and discussed common side effects  Continue as directed   Patient is strongly advised to be compliant with recommendations.    Return to Clinic sooner if needed.  Patient denies further questions/concerns at this time     Assessment/Plan   Problem List Items Addressed This Visit           ICD-10-CM    Class 1 obesity due to excess calories without serious comorbidity with body mass index (BMI) of 30.0 to 30.9 in adult E66.811, E66.09, Z68.30     Other Visit Diagnoses         Codes      Mass of upper outer quadrant of left breast    -  Primary N63.21    Relevant Orders    BI mammo left diagnostic tomosynthesis    BI US breast limited left      Fibrocystic change of breast, left     N60.12             FU in 2-6 weeks with breast testing and labs        [1]   Patient Active Problem List  Diagnosis    Glucose intolerance (impaired glucose tolerance)    Class 1 obesity due to excess calories without serious comorbidity with body mass index (BMI) of 30.0 to 30.9 in adult    Dietary counseling and surveillance    Intractable menstrual migraine with status migrainosus   [2]   Past Medical History:  Diagnosis Date    Encounter for gynecological examination (general) (routine) without abnormal findings     Pap test, as part of routine gynecological examination    Other conditions influencing health status     Menstruation   [3]   Past Surgical History:  Procedure Laterality Date    OTHER SURGICAL HISTORY  2019     section   [4]   Family History  Problem Relation Name Age of Onset    Diverticulitis Mother      Colon cancer Father's Brother      Lung cancer Paternal Grandfather     [5]   Social History  Tobacco Use    Smoking status:  Never     Passive exposure: Never    Smokeless tobacco: Never   Vaping Use    Vaping status: Never Used   Substance Use Topics    Alcohol use: Yes     Comment: rare    Drug use: Never   [6] No Known Allergies  [7]   No current outpatient medications on file.     No current facility-administered medications for this visit.

## 2025-06-04 ENCOUNTER — HOSPITAL ENCOUNTER (OUTPATIENT)
Dept: RADIOLOGY | Facility: HOSPITAL | Age: 30
Discharge: HOME | End: 2025-06-04
Payer: COMMERCIAL

## 2025-06-04 ENCOUNTER — TELEPHONE (OUTPATIENT)
Dept: PRIMARY CARE | Facility: CLINIC | Age: 30
End: 2025-06-04
Payer: COMMERCIAL

## 2025-06-04 VITALS — BODY MASS INDEX: 30.63 KG/M2 | WEIGHT: 156 LBS | HEIGHT: 60 IN

## 2025-06-04 DIAGNOSIS — N63.20 MASS OF MULTIPLE SITES OF LEFT BREAST: ICD-10-CM

## 2025-06-04 DIAGNOSIS — N63.21 MASS OF UPPER OUTER QUADRANT OF LEFT BREAST: ICD-10-CM

## 2025-06-04 DIAGNOSIS — R92.8 ABNORMAL MAMMOGRAM OF LEFT BREAST: Primary | ICD-10-CM

## 2025-06-04 PROCEDURE — 76642 ULTRASOUND BREAST LIMITED: CPT | Performed by: RADIOLOGY

## 2025-06-04 PROCEDURE — 77066 DX MAMMO INCL CAD BI: CPT | Performed by: RADIOLOGY

## 2025-06-04 PROCEDURE — 77062 BREAST TOMOSYNTHESIS BI: CPT

## 2025-06-04 PROCEDURE — 76642 ULTRASOUND BREAST LIMITED: CPT | Mod: LT

## 2025-06-04 PROCEDURE — 77062 BREAST TOMOSYNTHESIS BI: CPT | Performed by: RADIOLOGY

## 2025-06-04 NOTE — PROGRESS NOTES
I called patient today and she is aware of the concerns for breast cancer and the need for further work up at this time.    I have ordered for biopsy and gen surgery referral.  She is unsure if she wants done here or [facility].  Please see if we can schedule here quickly but we can change if she decides she wants [facility]   Change the follow up with Dr Meade to follow up with me

## 2025-06-06 ENCOUNTER — HOSPITAL ENCOUNTER (OUTPATIENT)
Dept: RADIOLOGY | Facility: HOSPITAL | Age: 30
Discharge: HOME | End: 2025-06-06
Payer: COMMERCIAL

## 2025-06-06 VITALS — HEART RATE: 90 BPM | DIASTOLIC BLOOD PRESSURE: 76 MMHG | SYSTOLIC BLOOD PRESSURE: 116 MMHG | OXYGEN SATURATION: 97 %

## 2025-06-06 DIAGNOSIS — N63.20 MASS OF MULTIPLE SITES OF LEFT BREAST: ICD-10-CM

## 2025-06-06 DIAGNOSIS — R92.8 ABNORMAL MAMMOGRAM OF LEFT BREAST: ICD-10-CM

## 2025-06-06 PROCEDURE — 19083 BX BREAST 1ST LESION US IMAG: CPT | Mod: LT

## 2025-06-06 PROCEDURE — 2720000007 HC OR 272 NO HCPCS

## 2025-06-06 PROCEDURE — 2500000004 HC RX 250 GENERAL PHARMACY W/ HCPCS (ALT 636 FOR OP/ED): Performed by: RADIOLOGY

## 2025-06-06 PROCEDURE — C1819 TISSUE LOCALIZATION-EXCISION: HCPCS

## 2025-06-06 PROCEDURE — 2780000003 HC OR 278 NO HCPCS

## 2025-06-06 PROCEDURE — C1739 HC OR 278 NO HCPCS: HCPCS

## 2025-06-06 RX ORDER — LIDOCAINE HYDROCHLORIDE 10 MG/ML
INJECTION, SOLUTION EPIDURAL; INFILTRATION; INTRACAUDAL; PERINEURAL
Status: COMPLETED | OUTPATIENT
Start: 2025-06-06 | End: 2025-06-06

## 2025-06-06 RX ORDER — LIDOCAINE HYDROCHLORIDE 20 MG/ML
INJECTION, SOLUTION EPIDURAL; INFILTRATION; INTRACAUDAL; PERINEURAL
Status: COMPLETED | OUTPATIENT
Start: 2025-06-06 | End: 2025-06-06

## 2025-06-06 RX ADMIN — LIDOCAINE HYDROCHLORIDE 7 ML: 10 INJECTION, SOLUTION EPIDURAL; INFILTRATION; INTRACAUDAL; PERINEURAL at 11:15

## 2025-06-06 RX ADMIN — LIDOCAINE HYDROCHLORIDE 3 ML: 20 INJECTION, SOLUTION EPIDURAL; INFILTRATION; INTRACAUDAL; PERINEURAL at 10:59

## 2025-06-06 RX ADMIN — LIDOCAINE HYDROCHLORIDE 7 ML: 20 INJECTION, SOLUTION EPIDURAL; INFILTRATION; INTRACAUDAL; PERINEURAL at 11:01

## 2025-06-06 RX ADMIN — LIDOCAINE HYDROCHLORIDE 8 ML: 10 INJECTION, SOLUTION EPIDURAL; INFILTRATION; INTRACAUDAL; PERINEURAL at 11:09

## 2025-06-06 ASSESSMENT — PAIN SCALES - GENERAL: PAINLEVEL_OUTOF10: 0 - NO PAIN

## 2025-06-06 NOTE — Clinical Note
US guided vac assisted left breast biopsy per Dr. Greco at [address] [place] [address]. Sterility maintained and patient tolerated well.

## 2025-06-06 NOTE — NURSING NOTE
Dc instructions provided and reviewed without questions. Patient dc'd home with mother. Patient ambulates to lobby with slow and steady gait.

## 2025-06-06 NOTE — Clinical Note
US guided vac assisted biopsy of left breast 1oclock mass per Dr. Greco. Sterility maintained and patient tolerated well.

## 2025-06-09 ENCOUNTER — TELEPHONE (OUTPATIENT)
Dept: SURGERY | Facility: CLINIC | Age: 30
End: 2025-06-09
Payer: COMMERCIAL

## 2025-06-09 DIAGNOSIS — N63.21 MASS OF UPPER OUTER QUADRANT OF LEFT BREAST: Primary | ICD-10-CM

## 2025-06-09 NOTE — TELEPHONE ENCOUNTER
Due to amount and location of nodules and patient's age, Dr. Vincent wants patient to see Dr. Nayely Matthews so reconstruction can be discussed at same time. I have reached out to Dr. Matthews's office and they will reach out to patient to get her scheduled in their office. Patient has been made aware of all this.

## 2025-06-10 ENCOUNTER — TELEPHONE (OUTPATIENT)
Dept: PRIMARY CARE | Facility: CLINIC | Age: 30
End: 2025-06-10
Payer: COMMERCIAL

## 2025-06-10 NOTE — PROGRESS NOTES
PT would like referred to [facility] instead of Nayely dykes ([place]  I placed the new order - please schedule ASAP   Path  for biopsy is still pending   Thanks

## 2025-06-11 ENCOUNTER — APPOINTMENT (OUTPATIENT)
Dept: PRIMARY CARE | Facility: CLINIC | Age: 30
End: 2025-06-11
Payer: COMMERCIAL

## 2025-06-12 ENCOUNTER — TELEPHONE (OUTPATIENT)
Dept: PRIMARY CARE | Facility: CLINIC | Age: 30
End: 2025-06-12
Payer: COMMERCIAL

## 2025-06-12 LAB
LAB AP ASR DISCLAIMER: NORMAL
LAB AP BLOCK FOR ADDITIONAL STUDIES: NORMAL
LABORATORY COMMENT REPORT: NORMAL
PATH REPORT.COMMENTS IMP SPEC: NORMAL
PATH REPORT.FINAL DX SPEC: NORMAL
PATH REPORT.GROSS SPEC: NORMAL
PATH REPORT.RELEVANT HX SPEC: NORMAL
PATH REPORT.TOTAL CANCER: NORMAL
PATHOLOGY SYNOPTIC REPORT: NORMAL
RESIDENT REVIEW: NORMAL

## 2025-06-13 NOTE — TELEPHONE ENCOUNTER
I called and spoke with patient about the positive breast cancer path report   Please fax a copy to the surgeon in [place] that we referred her to and call to notify that office verbally of the results as well.  It sounds as though they were not going to schedule her until they had the path report     Also please print out her lab orders and mail to her so she can have done at any location     Thanks

## 2025-06-13 NOTE — TELEPHONE ENCOUNTER
FAXED PATHOLOGY REPORT TO DR ALVAREZ'S OFFICE.  PATIENT HAS ALREADY BEEN IN CONTACT WITH OFFICE AND IS SCHEDULED FOR 6/16/2025.  PER DR ALVAREZ'S OFFICE THERE IS PLASTICS OFFICE [facility] FROM THEM AND WILL REFER PATIENT THERE IF NEEDED.

## 2025-06-17 ENCOUNTER — APPOINTMENT (OUTPATIENT)
Dept: SURGERY | Facility: CLINIC | Age: 30
End: 2025-06-17
Payer: COMMERCIAL

## 2025-06-19 ENCOUNTER — HOSPITAL ENCOUNTER (OUTPATIENT)
Age: 30
Discharge: HOME | End: 2025-06-19
Payer: COMMERCIAL

## 2025-06-19 DIAGNOSIS — C50.912: Primary | ICD-10-CM

## 2025-06-19 PROCEDURE — A4216 STERILE WATER/SALINE, 10 ML: HCPCS

## 2025-06-19 PROCEDURE — 77049 MRI BREAST C-+ W/CAD BI: CPT

## 2025-06-19 PROCEDURE — A9575 INJ GADOTERATE MEGLUMI 0.1ML: HCPCS

## 2025-06-19 PROCEDURE — C8908 MRI W/O FOL W/CONT, BREAST,: HCPCS

## 2025-06-24 ENCOUNTER — APPOINTMENT (OUTPATIENT)
Dept: PRIMARY CARE | Facility: CLINIC | Age: 30
End: 2025-06-24
Payer: COMMERCIAL

## 2025-06-24 VITALS
BODY MASS INDEX: 31.65 KG/M2 | HEIGHT: 60 IN | DIASTOLIC BLOOD PRESSURE: 78 MMHG | HEART RATE: 88 BPM | SYSTOLIC BLOOD PRESSURE: 119 MMHG | WEIGHT: 161.2 LBS

## 2025-06-24 DIAGNOSIS — C50.912 INFILTRATING DUCTAL CARCINOMA OF LEFT BREAST: Primary | ICD-10-CM

## 2025-06-24 DIAGNOSIS — F33.0 DEPRESSION, MAJOR, RECURRENT, MILD: ICD-10-CM

## 2025-06-24 DIAGNOSIS — E66.09 CLASS 1 OBESITY DUE TO EXCESS CALORIES WITHOUT SERIOUS COMORBIDITY WITH BODY MASS INDEX (BMI) OF 31.0 TO 31.9 IN ADULT: ICD-10-CM

## 2025-06-24 DIAGNOSIS — E66.811 CLASS 1 OBESITY DUE TO EXCESS CALORIES WITHOUT SERIOUS COMORBIDITY WITH BODY MASS INDEX (BMI) OF 31.0 TO 31.9 IN ADULT: ICD-10-CM

## 2025-06-24 PROCEDURE — 3008F BODY MASS INDEX DOCD: CPT | Performed by: PHYSICIAN ASSISTANT

## 2025-06-24 PROCEDURE — 1036F TOBACCO NON-USER: CPT | Performed by: PHYSICIAN ASSISTANT

## 2025-06-24 PROCEDURE — 99214 OFFICE O/P EST MOD 30 MIN: CPT | Performed by: PHYSICIAN ASSISTANT

## 2025-06-24 RX ORDER — SERTRALINE HYDROCHLORIDE 50 MG/1
50 TABLET, FILM COATED ORAL DAILY
Qty: 30 TABLET | Refills: 5 | Status: SHIPPED | OUTPATIENT
Start: 2025-06-24

## 2025-06-24 ASSESSMENT — ENCOUNTER SYMPTOMS
DIARRHEA: 0
CONSTIPATION: 0
WHEEZING: 0
FLANK PAIN: 0
WOUND: 0
DYSPHORIC MOOD: 1
CONFUSION: 0
TREMORS: 0
SINUS PAIN: 0
NECK PAIN: 0
BACK PAIN: 0
NERVOUS/ANXIOUS: 1
RHINORRHEA: 0
DIZZINESS: 0
SORE THROAT: 0
VOMITING: 0
FATIGUE: 0
EYE REDNESS: 0
FEVER: 0
FREQUENCY: 0
ABDOMINAL PAIN: 0
BRUISES/BLEEDS EASILY: 0
EYE DISCHARGE: 0
NAUSEA: 0
CHILLS: 0
COUGH: 0
SLEEP DISTURBANCE: 0
HEADACHES: 0
CHEST TIGHTNESS: 0
SHORTNESS OF BREATH: 0
NUMBNESS: 0
PALPITATIONS: 0

## 2025-06-24 ASSESSMENT — PATIENT HEALTH QUESTIONNAIRE - PHQ9
2. FEELING DOWN, DEPRESSED OR HOPELESS: NOT AT ALL
SUM OF ALL RESPONSES TO PHQ9 QUESTIONS 1 AND 2: 0
1. LITTLE INTEREST OR PLEASURE IN DOING THINGS: NOT AT ALL

## 2025-06-24 NOTE — PROGRESS NOTES
Subjective   Patient ID: Cesia Brunner is a 30 y.o. female who presents for Follow-up (1 MONTH F/U WITH LABS AND BREAST TESTING)    HPI    FU Breast biopsy   We have already discussed the findings via message   Diagnosed with invasive ductal carcinoma grade 2-3 and has followed with the surgeon and specialists in [place] a few times  She is awaiting genetic testing   She is happy with her care at this time   She states she has a good support system and chasity which has helped her navigate through the last few weeks       Med check - currently not taking meds and denies changes in meds in the past year prior to this   Depression in the past when her child was in the nicu  Lexapro in the past - hx of chronic diarrhea   Denies trying other meds   She is interested in starting something given the recent breast cancer diagnosis - she has agreed to zoloft - 25 mg and if doing well can inc to 50 mg   Discussed buspar as well if needed        Preventative testing   PAP - [facility]   Mammo  DEXA   Colon      Fall NEG April 2025   PHQ2 - NEG April 2025       Problem List[1]    Review of Systems   Constitutional:  Negative for chills, fatigue and fever.   HENT:  Negative for congestion, rhinorrhea, sinus pain, sore throat and tinnitus.    Eyes:  Negative for discharge, redness and visual disturbance.   Respiratory:  Negative for cough, chest tightness, shortness of breath and wheezing.    Cardiovascular:  Negative for chest pain, palpitations and leg swelling.   Gastrointestinal:  Negative for abdominal pain, constipation, diarrhea, nausea and vomiting.   Endocrine: Negative for cold intolerance and heat intolerance.   Genitourinary:  Negative for flank pain, frequency and urgency.   Musculoskeletal:  Negative for back pain, gait problem and neck pain.   Skin:  Negative for rash and wound.   Neurological:  Negative for dizziness, tremors, syncope, numbness and headaches.   Hematological:  Does not bruise/bleed  easily.   Psychiatric/Behavioral:  Positive for dysphoric mood. Negative for confusion, sleep disturbance and suicidal ideas. The patient is nervous/anxious.        Medical History[2]    Surgical History[3]    Family History[4]    Social History[5]    Allergies[6]    Current Medications[7]    Objective   /78   Pulse 88   Ht 1.524 m (5')   Wt 73.1 kg (161 lb 3.2 oz)   BMI 31.48 kg/m²     Physical Exam  Vitals reviewed.   Constitutional:       Appearance: Normal appearance. She is obese.   HENT:      Head: Normocephalic.      Right Ear: External ear normal.      Left Ear: External ear normal.      Nose: Nose normal. No congestion or rhinorrhea.      Mouth/Throat:      Mouth: Mucous membranes are moist.   Eyes:      Extraocular Movements: Extraocular movements intact.      Conjunctiva/sclera: Conjunctivae normal.      Pupils: Pupils are equal, round, and reactive to light.   Cardiovascular:      Rate and Rhythm: Normal rate and regular rhythm.      Pulses: Normal pulses.   Pulmonary:      Effort: Pulmonary effort is normal.      Breath sounds: Normal breath sounds.   Abdominal:      General: Bowel sounds are normal.      Palpations: Abdomen is soft.      Tenderness: There is no abdominal tenderness. There is no right CVA tenderness or left CVA tenderness.   Musculoskeletal:         General: No tenderness. Normal range of motion.      Cervical back: Normal range of motion and neck supple. No tenderness.   Skin:     General: Skin is warm and dry.   Neurological:      General: No focal deficit present.      Mental Status: She is alert and oriented to person, place, and time.   Psychiatric:         Mood and Affect: Mood normal.         Behavior: Behavior normal.       Testing   Reviewed breast testing again   Labs are ordered but she likely will have labs done with her specialists       Impression    MDM    1) COMPLEXITY: 1 UNDIAGNOSED NEW PROBLEM WITH UNCERTAIN PROGNOSIS  2)DATA: TESTS INTERPRETED AND OR  ORDERED, TOOK INDEPENDENT HISTORY OR RECORDS REVIEWED  3)RISK: MODERATE RISK DUE TO NATURE OF MEDICAL CONDITIONS/COMORBIDITY OR MEDICATIONS ORDERED OR SURGICAL OR PROCEDURE REFERRAL, .     Reviewed labs and Testing on file   Patient to follow diet low in cholesterol, fat, and sodium.    Patient is advised to increase Exercise.  Patient is recommended to lose weight.  Reviewed Meds and discussed common side effects  Continue as directed   Patient is strongly advised to be compliant with recommendations.    Return to Clinic sooner if needed.  Patient denies further questions/concerns at this time     Assessment/Plan   Problem List Items Addressed This Visit           ICD-10-CM    Class 1 obesity due to excess calories without serious comorbidity with body mass index (BMI) of 31.0 to 31.9 in adult E66.811, E66.09, Z68.31    Infiltrating ductal carcinoma of left breast - Primary C50.912    Depression, major, recurrent, mild F33.0    Relevant Medications    sertraline (Zoloft) 50 mg tablet        FU in 1-2 mo with med check /mood check          [1]   Patient Active Problem List  Diagnosis    Glucose intolerance (impaired glucose tolerance)    Class 1 obesity due to excess calories without serious comorbidity with body mass index (BMI) of 30.0 to 30.9 in adult    Dietary counseling and surveillance    Intractable menstrual migraine with status migrainosus   [2]   Past Medical History:  Diagnosis Date    Encounter for gynecological examination (general) (routine) without abnormal findings     Pap test, as part of routine gynecological examination    Other conditions influencing health status     Menstruation   [3]   Past Surgical History:  Procedure Laterality Date     SECTION, LOW TRANSVERSE  4-25-17 10-3-19 6-11-21    OTHER SURGICAL HISTORY  2019     section   [4]   Family History  Problem Relation Name Age of Onset    Diverticulitis Mother      Colon cancer Father's Brother      Lung cancer  Paternal Grandfather      Alcohol abuse Father Michael    [5]   Social History  Tobacco Use    Smoking status: Never     Passive exposure: Never    Smokeless tobacco: Never   Vaping Use    Vaping status: Never Used   Substance Use Topics    Alcohol use: Yes     Comment: rare    Drug use: Never   [6] No Known Allergies  [7]   No current outpatient medications on file.     No current facility-administered medications for this visit.

## 2025-06-25 ENCOUNTER — HOSPITAL ENCOUNTER (OUTPATIENT)
Age: 30
Discharge: HOME | End: 2025-06-25
Payer: COMMERCIAL

## 2025-06-25 DIAGNOSIS — C50.912: Primary | ICD-10-CM

## 2025-06-25 DIAGNOSIS — R92.8: ICD-10-CM

## 2025-06-25 PROCEDURE — 76642 ULTRASOUND BREAST LIMITED: CPT

## 2025-07-03 ENCOUNTER — HOSPITAL ENCOUNTER (OUTPATIENT)
Dept: HOSPITAL 100 - CVS | Age: 30
Discharge: HOME | End: 2025-07-03
Payer: COMMERCIAL

## 2025-07-03 DIAGNOSIS — C50.912: Primary | ICD-10-CM

## 2025-07-03 PROCEDURE — 93356 MYOCRD STRAIN IMG SPCKL TRCK: CPT

## 2025-07-03 PROCEDURE — 93306 TTE W/DOPPLER COMPLETE: CPT

## 2025-07-08 ENCOUNTER — HOSPITAL ENCOUNTER (OUTPATIENT)
Dept: HOSPITAL 100 - SDC | Age: 30
Discharge: HOME | End: 2025-07-08
Payer: COMMERCIAL

## 2025-07-08 VITALS
DIASTOLIC BLOOD PRESSURE: 67 MMHG | RESPIRATION RATE: 16 BRPM | HEART RATE: 86 BPM | OXYGEN SATURATION: 98 % | SYSTOLIC BLOOD PRESSURE: 103 MMHG

## 2025-07-08 VITALS
OXYGEN SATURATION: 98 % | TEMPERATURE: 96.9 F | RESPIRATION RATE: 16 BRPM | SYSTOLIC BLOOD PRESSURE: 108 MMHG | HEART RATE: 85 BPM | DIASTOLIC BLOOD PRESSURE: 57 MMHG

## 2025-07-08 VITALS — SYSTOLIC BLOOD PRESSURE: 103 MMHG | DIASTOLIC BLOOD PRESSURE: 67 MMHG

## 2025-07-08 VITALS
OXYGEN SATURATION: 99 % | HEART RATE: 77 BPM | TEMPERATURE: 96.98 F | SYSTOLIC BLOOD PRESSURE: 108 MMHG | DIASTOLIC BLOOD PRESSURE: 57 MMHG | RESPIRATION RATE: 16 BRPM

## 2025-07-08 VITALS
SYSTOLIC BLOOD PRESSURE: 103 MMHG | RESPIRATION RATE: 16 BRPM | TEMPERATURE: 97.5 F | OXYGEN SATURATION: 99 % | DIASTOLIC BLOOD PRESSURE: 67 MMHG | HEART RATE: 73 BPM

## 2025-07-08 VITALS
SYSTOLIC BLOOD PRESSURE: 94 MMHG | TEMPERATURE: 97.16 F | DIASTOLIC BLOOD PRESSURE: 50 MMHG | OXYGEN SATURATION: 100 % | HEART RATE: 74 BPM | RESPIRATION RATE: 16 BRPM

## 2025-07-08 VITALS
RESPIRATION RATE: 16 BRPM | SYSTOLIC BLOOD PRESSURE: 105 MMHG | OXYGEN SATURATION: 99 % | DIASTOLIC BLOOD PRESSURE: 67 MMHG | HEART RATE: 83 BPM

## 2025-07-08 VITALS
RESPIRATION RATE: 16 BRPM | OXYGEN SATURATION: 99 % | DIASTOLIC BLOOD PRESSURE: 67 MMHG | SYSTOLIC BLOOD PRESSURE: 103 MMHG | HEART RATE: 73 BPM | TEMPERATURE: 97.52 F

## 2025-07-08 VITALS — BODY MASS INDEX: 31.7 KG/M2

## 2025-07-08 DIAGNOSIS — Z17.31: ICD-10-CM

## 2025-07-08 DIAGNOSIS — C50.912: ICD-10-CM

## 2025-07-08 DIAGNOSIS — Z45.2: Primary | ICD-10-CM

## 2025-07-08 LAB
INTERNAL QC VALIDATED?: (no result)
PREGNANCY, URINE: NEGATIVE NEGATIVE
RECORD KIT LOT#,URINE PREG: (no result)

## 2025-07-08 PROCEDURE — 71045 X-RAY EXAM CHEST 1 VIEW: CPT

## 2025-07-08 PROCEDURE — 77001 FLUOROGUIDE FOR VEIN DEVICE: CPT

## 2025-07-08 PROCEDURE — 81025 URINE PREGNANCY TEST: CPT

## 2025-07-08 PROCEDURE — 36561 INSERT TUNNELED CV CATH: CPT

## 2025-07-08 PROCEDURE — 00532 ANES ACCESS CTR VENOUS CRCJ: CPT

## 2025-07-08 RX ADMIN — SODIUM CHLORIDE, POTASSIUM CHLORIDE, SODIUM LACTATE AND CALCIUM CHLORIDE 15 ML: 600; 310; 30; 20 INJECTION, SOLUTION INTRAVENOUS at 06:52

## 2025-07-08 RX ADMIN — BUPIVACAINE HYDROCHLORIDE 30 ML: 5 INJECTION, SOLUTION EPIDURAL; INTRACAUDAL at 08:00

## 2025-07-08 RX ADMIN — LIDOCAINE HYDROCHLORIDE AND EPINEPHRINE 50 ML: 10; 10 INJECTION, SOLUTION INFILTRATION; PERINEURAL at 08:00

## 2025-07-09 ENCOUNTER — HOSPITAL ENCOUNTER (OUTPATIENT)
Dept: HOSPITAL 100 - NM | Age: 30
Discharge: HOME | End: 2025-07-09
Payer: COMMERCIAL

## 2025-07-09 DIAGNOSIS — R59.0: ICD-10-CM

## 2025-07-09 DIAGNOSIS — Z17.31: ICD-10-CM

## 2025-07-09 DIAGNOSIS — C50.912: Primary | ICD-10-CM

## 2025-07-09 PROCEDURE — A9503 TC99M MEDRONATE: HCPCS

## 2025-07-09 PROCEDURE — 78306 BONE IMAGING WHOLE BODY: CPT

## 2025-07-16 ENCOUNTER — HOSPITAL ENCOUNTER (OUTPATIENT)
Dept: HOSPITAL 100 - CT | Age: 30
Discharge: HOME | End: 2025-07-16
Payer: COMMERCIAL

## 2025-07-16 DIAGNOSIS — R59.0: ICD-10-CM

## 2025-07-16 DIAGNOSIS — Z17.31: ICD-10-CM

## 2025-07-16 DIAGNOSIS — C50.912: Primary | ICD-10-CM

## 2025-07-16 PROCEDURE — 71260 CT THORAX DX C+: CPT

## 2025-07-16 PROCEDURE — 74177 CT ABD & PELVIS W/CONTRAST: CPT

## 2025-08-05 ENCOUNTER — OFFICE VISIT (OUTPATIENT)
Dept: PRIMARY CARE | Facility: CLINIC | Age: 30
End: 2025-08-05
Payer: COMMERCIAL

## 2025-08-05 VITALS
DIASTOLIC BLOOD PRESSURE: 77 MMHG | BODY MASS INDEX: 30.78 KG/M2 | SYSTOLIC BLOOD PRESSURE: 114 MMHG | HEIGHT: 60 IN | HEART RATE: 73 BPM | WEIGHT: 156.8 LBS

## 2025-08-05 DIAGNOSIS — F33.0 DEPRESSION, MAJOR, RECURRENT, MILD: ICD-10-CM

## 2025-08-05 DIAGNOSIS — C50.912 INFILTRATING DUCTAL CARCINOMA OF LEFT BREAST: ICD-10-CM

## 2025-08-05 DIAGNOSIS — R30.0 DYSURIA: ICD-10-CM

## 2025-08-05 DIAGNOSIS — R35.0 URINARY FREQUENCY: Primary | ICD-10-CM

## 2025-08-05 DIAGNOSIS — E66.09 CLASS 1 OBESITY DUE TO EXCESS CALORIES WITHOUT SERIOUS COMORBIDITY WITH BODY MASS INDEX (BMI) OF 30.0 TO 30.9 IN ADULT: ICD-10-CM

## 2025-08-05 DIAGNOSIS — E66.811 CLASS 1 OBESITY DUE TO EXCESS CALORIES WITHOUT SERIOUS COMORBIDITY WITH BODY MASS INDEX (BMI) OF 30.0 TO 30.9 IN ADULT: ICD-10-CM

## 2025-08-05 LAB
POC APPEARANCE, URINE: CLEAR
POC BILIRUBIN, URINE: NEGATIVE
POC BLOOD, URINE: ABNORMAL
POC COLOR, URINE: YELLOW
POC GLUCOSE, URINE: ABNORMAL MG/DL
POC KETONES, URINE: ABNORMAL MG/DL
POC LEUKOCYTES, URINE: NEGATIVE
POC NITRITE,URINE: NEGATIVE
POC PH, URINE: 5.5 PH
POC PROTEIN, URINE: ABNORMAL MG/DL
POC SPECIFIC GRAVITY, URINE: >=1.03
POC UROBILINOGEN, URINE: 0.2 EU/DL

## 2025-08-05 PROCEDURE — 99214 OFFICE O/P EST MOD 30 MIN: CPT | Performed by: PHYSICIAN ASSISTANT

## 2025-08-05 PROCEDURE — 81003 URINALYSIS AUTO W/O SCOPE: CPT | Performed by: PHYSICIAN ASSISTANT

## 2025-08-05 PROCEDURE — 3008F BODY MASS INDEX DOCD: CPT | Performed by: PHYSICIAN ASSISTANT

## 2025-08-05 PROCEDURE — 1036F TOBACCO NON-USER: CPT | Performed by: PHYSICIAN ASSISTANT

## 2025-08-05 RX ORDER — LIDOCAINE AND PRILOCAINE 25; 25 MG/G; MG/G
CREAM TOPICAL
COMMUNITY
Start: 2025-07-24

## 2025-08-05 RX ORDER — ONDANSETRON 8 MG/1
8 TABLET, ORALLY DISINTEGRATING ORAL EVERY 8 HOURS PRN
COMMUNITY
Start: 2025-07-24

## 2025-08-05 RX ORDER — PROCHLORPERAZINE MALEATE 10 MG
10 TABLET ORAL EVERY 6 HOURS PRN
COMMUNITY
Start: 2025-07-24

## 2025-08-05 RX ORDER — DEXAMETHASONE 4 MG/1
TABLET ORAL
COMMUNITY
Start: 2025-07-24

## 2025-08-05 RX ORDER — ACETAMINOPHEN 500 MG/1
650 CAPSULE, LIQUID FILLED ORAL
COMMUNITY
Start: 2025-06-17

## 2025-08-05 ASSESSMENT — ENCOUNTER SYMPTOMS
FLANK PAIN: 0
CHEST TIGHTNESS: 0
PALPITATIONS: 0
CONFUSION: 0
FEVER: 0
FATIGUE: 1
WOUND: 0
BACK PAIN: 0
EYE DISCHARGE: 0
SHORTNESS OF BREATH: 0
DYSPHORIC MOOD: 1
CHILLS: 0
NERVOUS/ANXIOUS: 1
EYE REDNESS: 0
BRUISES/BLEEDS EASILY: 0
SLEEP DISTURBANCE: 0
VOMITING: 0
DYSURIA: 1
HEADACHES: 0
SORE THROAT: 0
WHEEZING: 0
RHINORRHEA: 0
DIZZINESS: 0
ABDOMINAL PAIN: 0
NECK PAIN: 0
DIARRHEA: 0
FREQUENCY: 1
TREMORS: 0
SINUS PAIN: 0
CONSTIPATION: 0
NUMBNESS: 0
NAUSEA: 0
COUGH: 0

## 2025-08-05 ASSESSMENT — PATIENT HEALTH QUESTIONNAIRE - PHQ9
SUM OF ALL RESPONSES TO PHQ9 QUESTIONS 1 AND 2: 0
1. LITTLE INTEREST OR PLEASURE IN DOING THINGS: NOT AT ALL
2. FEELING DOWN, DEPRESSED OR HOPELESS: NOT AT ALL

## 2025-08-05 NOTE — PROGRESS NOTES
Subjective   Patient ID: Cesia Brunner is a 30 y.o. female who presents for Follow-up (C/O BURNING UPON URINATION, FREQUENCY X 3 DAYS, .8 FEVER ON 8/4/25)    HPI  Urinary symptoms x 3 days   Dysuria, freq, hx of fever on 8/4/25 - since resolved   Symptoms improved but not resolved   She did recently start treatment for breast cancer  In office urine - trace blood but neg leukocytes/ nitrates   Will send to culture   Given symptoms improving and dip findings discussed treating supportively with cranberry supplement and inc fluids and will wait for culture   I question of the symptoms are secondary to recent chemo start side effects     On chemo   Currently on docetaxel/carboplatin and TCH + trastuzumab  Injection every 3 weeks -Pertuzumab      Med check - currently not taking meds and denies changes in meds in the past year prior to this   Depression in the past when her child was in the nicu  Lexapro in the past - hx of chronic diarrhea   Zoloft - diarrhea - declines wanting meds at this time   Discussed buspar as well if needed         Preventative testing   PAP - [facility]   Mammo  DEXA   Colon      Fall NEG April 2025   PHQ2 - NEG April 2025     Problem List[1]    Review of Systems   Constitutional:  Positive for fatigue. Negative for chills and fever.   HENT:  Negative for congestion, rhinorrhea, sinus pain, sore throat and tinnitus.    Eyes:  Negative for discharge, redness and visual disturbance.   Respiratory:  Negative for cough, chest tightness, shortness of breath and wheezing.    Cardiovascular:  Negative for chest pain, palpitations and leg swelling.   Gastrointestinal:  Negative for abdominal pain, constipation, diarrhea, nausea and vomiting.   Endocrine: Negative for cold intolerance and heat intolerance.   Genitourinary:  Positive for dysuria and frequency. Negative for flank pain and urgency.   Musculoskeletal:  Negative for back pain, gait problem and neck pain.   Skin:  Negative  for rash and wound.   Neurological:  Negative for dizziness, tremors, syncope, numbness and headaches.   Hematological:  Does not bruise/bleed easily.   Psychiatric/Behavioral:  Positive for dysphoric mood. Negative for confusion, sleep disturbance and suicidal ideas. The patient is nervous/anxious.        Medical History[2]    Surgical History[3]    Family History[4]    Social History[5]    Allergies[6]    Current Medications[7]    Objective   /77   Pulse 73   Ht (!) 1.524 m (5')   Wt 71.1 kg (156 lb 12.8 oz)   BMI 30.62 kg/m²     Physical Exam  Vitals reviewed.   Constitutional:       Appearance: Normal appearance. She is obese.   HENT:      Head: Normocephalic.      Right Ear: External ear normal.      Left Ear: External ear normal.      Nose: Nose normal. No congestion or rhinorrhea.      Mouth/Throat:      Mouth: Mucous membranes are moist.     Eyes:      Extraocular Movements: Extraocular movements intact.      Conjunctiva/sclera: Conjunctivae normal.      Pupils: Pupils are equal, round, and reactive to light.       Cardiovascular:      Rate and Rhythm: Normal rate and regular rhythm.      Pulses: Normal pulses.   Pulmonary:      Effort: Pulmonary effort is normal.      Breath sounds: Normal breath sounds.   Abdominal:      General: Bowel sounds are normal.      Palpations: Abdomen is soft.      Tenderness: There is no abdominal tenderness. There is no right CVA tenderness or left CVA tenderness.     Musculoskeletal:         General: No tenderness. Normal range of motion.      Cervical back: Normal range of motion and neck supple. No tenderness.     Skin:     General: Skin is warm and dry.     Neurological:      General: No focal deficit present.      Mental Status: She is alert and oriented to person, place, and time.     Psychiatric:         Mood and Affect: Mood normal.         Behavior: Behavior normal.       In office urine   Trace blood - send to culture       Impression    MDM    1)  COMPLEXITY: 1 UNDIAGNOSED NEW PROBLEM WITH UNCERTAIN PROGNOSIS  2)DATA: TESTS INTERPRETED AND OR ORDERED, TOOK INDEPENDENT HISTORY OR RECORDS REVIEWED  3)RISK: MODERATE RISK DUE TO NATURE OF MEDICAL CONDITIONS/COMORBIDITY OR MEDICATIONS ORDERED OR SURGICAL OR PROCEDURE REFERRAL, .     Reviewed labs and Testing on file   Patient to follow diet low in cholesterol, fat, and sodium.    Patient is advised to increase Exercise.  Patient is recommended to lose weight.  Reviewed Meds and discussed common side effects  Continue as directed   Patient is strongly advised to be compliant with recommendations.    Return to Clinic sooner if needed.  Patient denies further questions/concerns at this time     Assessment/Plan   Problem List Items Addressed This Visit           ICD-10-CM    Class 1 obesity due to excess calories without serious comorbidity with body mass index (BMI) of 30.0 to 30.9 in adult E66.811, E66.09, Z68.30    Infiltrating ductal carcinoma of left breast C50.912    Depression, major, recurrent, mild F33.0    Relevant Medications    prochlorperazine (Compazine) 10 mg tablet     Other Visit Diagnoses         Codes      Urinary frequency    -  Primary R35.0    Relevant Orders    POCT UA Automated manually resulted (Completed)    Urine Culture      Dysuria     R30.0    Relevant Orders    POCT UA Automated manually resulted (Completed)    Urine Culture             FU PRN          [1]   Patient Active Problem List  Diagnosis    Glucose intolerance (impaired glucose tolerance)    Class 1 obesity due to excess calories without serious comorbidity with body mass index (BMI) of 31.0 to 31.9 in adult    Dietary counseling and surveillance    Intractable menstrual migraine with status migrainosus    Infiltrating ductal carcinoma of left breast    Depression, major, recurrent, mild   [2]   Past Medical History:  Diagnosis Date    Encounter for gynecological examination (general) (routine) without abnormal findings     Pap  test, as part of routine gynecological examination    Other conditions influencing health status     Menstruation   [3]   Past Surgical History:  Procedure Laterality Date     SECTION, LOW TRANSVERSE  17 10-3-19 6--21    OTHER SURGICAL HISTORY  2019     section   [4]   Family History  Problem Relation Name Age of Onset    Diverticulitis Mother      Colon cancer Father's Brother      Lung cancer Paternal Grandfather      Alcohol abuse Father Michael    [5]   Social History  Tobacco Use    Smoking status: Never     Passive exposure: Never    Smokeless tobacco: Never   Vaping Use    Vaping status: Never Used   Substance Use Topics    Alcohol use: Yes     Comment: rare    Drug use: Never   [6] No Known Allergies  [7]   Current Outpatient Medications   Medication Sig Dispense Refill    acetaminophen (Tylenol) 500 mg capsule 650 mg.      dexAMETHasone (Decadron) 4 mg tablet TAKE 2 TABLETS BY MOUTH TWICE DAILY - TAKE ONLY THE DAY BEFORE, THE DAY OF & THE DAY AFTER CHEMOTHERAPY.      lidocaine-prilocaine (Emla) 2.5-2.5 % cream APPLY CREAM TOPICALLY ONCE TO AFFECTED AREA AS NEEDED FOR PORT ACCESS      ondansetron ODT (Zofran-ODT) 8 mg disintegrating tablet Dissolve 1 tablet (8 mg) in the mouth every 8 hours if needed for nausea or vomiting.      prochlorperazine (Compazine) 10 mg tablet Take 1 tablet (10 mg) by mouth every 6 hours if needed for nausea or vomiting.      sertraline (Zoloft) 50 mg tablet Take 1 tablet (50 mg) by mouth once daily. (Patient not taking: Reported on 2025) 30 tablet 5     No current facility-administered medications for this visit.

## 2025-08-07 LAB — BACTERIA UR CULT: NORMAL

## 2025-08-25 ENCOUNTER — APPOINTMENT (OUTPATIENT)
Dept: PRIMARY CARE | Facility: CLINIC | Age: 30
End: 2025-08-25
Payer: COMMERCIAL